# Patient Record
Sex: MALE | Race: ASIAN | NOT HISPANIC OR LATINO | URBAN - METROPOLITAN AREA
[De-identification: names, ages, dates, MRNs, and addresses within clinical notes are randomized per-mention and may not be internally consistent; named-entity substitution may affect disease eponyms.]

---

## 2018-04-19 ENCOUNTER — OFFICE VISIT - HEALTHEAST (OUTPATIENT)
Dept: FAMILY MEDICINE | Facility: CLINIC | Age: 43
End: 2018-04-19

## 2018-04-19 DIAGNOSIS — Z86.19 HISTORY OF HEPATITIS B: ICD-10-CM

## 2018-04-19 DIAGNOSIS — Z13.220 SCREENING CHOLESTEROL LEVEL: ICD-10-CM

## 2018-04-19 LAB
ALBUMIN SERPL-MCNC: 4.1 G/DL (ref 3.5–5)
ALP SERPL-CCNC: 70 U/L (ref 45–120)
ALT SERPL W P-5'-P-CCNC: 50 U/L (ref 0–45)
ANION GAP SERPL CALCULATED.3IONS-SCNC: 11 MMOL/L (ref 5–18)
AST SERPL W P-5'-P-CCNC: 31 U/L (ref 0–40)
BILIRUB SERPL-MCNC: 0.5 MG/DL (ref 0–1)
BUN SERPL-MCNC: 12 MG/DL (ref 8–22)
CALCIUM SERPL-MCNC: 9.1 MG/DL (ref 8.5–10.5)
CHLORIDE BLD-SCNC: 104 MMOL/L (ref 98–107)
CHOLEST SERPL-MCNC: 230 MG/DL
CO2 SERPL-SCNC: 26 MMOL/L (ref 22–31)
CREAT SERPL-MCNC: 1.06 MG/DL (ref 0.7–1.3)
FASTING STATUS PATIENT QL REPORTED: ABNORMAL
GFR SERPL CREATININE-BSD FRML MDRD: >60 ML/MIN/1.73M2
GLUCOSE BLD-MCNC: 93 MG/DL (ref 70–125)
HDLC SERPL-MCNC: 48 MG/DL
LDLC SERPL CALC-MCNC: 154 MG/DL
POTASSIUM BLD-SCNC: 4.6 MMOL/L (ref 3.5–5)
PROT SERPL-MCNC: 7.9 G/DL (ref 6–8)
SODIUM SERPL-SCNC: 141 MMOL/L (ref 136–145)
TRIGL SERPL-MCNC: 142 MG/DL

## 2018-04-19 ASSESSMENT — MIFFLIN-ST. JEOR: SCORE: 1585.04

## 2018-04-20 LAB
HAV IGM SERPL QL IA: NEGATIVE
HBV CORE IGM SERPL QL IA: NEGATIVE
HBV SURFACE AB SERPL IA-ACNC: NEGATIVE M[IU]/ML
HBV SURFACE AG SERPL QL IA: ABNORMAL
HCV AB SERPL QL IA: NEGATIVE

## 2018-04-24 ENCOUNTER — AMBULATORY - HEALTHEAST (OUTPATIENT)
Dept: FAMILY MEDICINE | Facility: CLINIC | Age: 43
End: 2018-04-24

## 2018-04-24 DIAGNOSIS — B19.10 HEPATITIS B: ICD-10-CM

## 2018-04-25 ENCOUNTER — AMBULATORY - HEALTHEAST (OUTPATIENT)
Dept: LAB | Facility: CLINIC | Age: 43
End: 2018-04-25

## 2018-04-25 DIAGNOSIS — B19.10 HEPATITIS B: ICD-10-CM

## 2018-04-25 LAB — AFP SERPL-MCNC: <2 UG/ML

## 2018-04-27 LAB
HBV DNA SERPL NAA+PROBE-ACNC: 1381 [IU]/ML
HBV DNA SERPL NAA+PROBE-LOG IU: 3.1 {LOG_IU}/ML

## 2018-05-02 ENCOUNTER — AMBULATORY - HEALTHEAST (OUTPATIENT)
Dept: FAMILY MEDICINE | Facility: CLINIC | Age: 43
End: 2018-05-02

## 2018-05-02 DIAGNOSIS — B19.10 HEPATITIS B: ICD-10-CM

## 2018-05-03 ENCOUNTER — HOSPITAL ENCOUNTER (OUTPATIENT)
Dept: ULTRASOUND IMAGING | Facility: HOSPITAL | Age: 43
Discharge: HOME | End: 2018-05-03
Attending: FAMILY MEDICINE

## 2018-05-03 DIAGNOSIS — B19.10 HEPATITIS B: ICD-10-CM

## 2021-06-01 VITALS — HEIGHT: 64 IN | BODY MASS INDEX: 29.81 KG/M2 | WEIGHT: 174.6 LBS

## 2021-06-17 NOTE — PROGRESS NOTES
"Assessment / Impression     1. History of hepatitis B  Hepatitis Acute Evaluation    Comprehensive Metabolic Panel    Hepatitis B Surface Antibody (Anti-HBs) Vaccine Check   2. Screening cholesterol level  Lipid Cascade         Plan:     I recommended that we check the above labs.  It will be helpful to see what the hepatitis B surface antigen result is.  If this is negative and the core antibody test is positive symptoms are likely secondary to a resolved hepatitis B infection.  I recommended that we check the liver enzymes as well.  Since he is fasting we are going to check a cholesterol panel.    Subjective:      HPI: Cheyenne Barnes is a 42 y.o. male who presents to the clinic for follow-up.  He reports having history of hepatitis B based on lab results.  He states that his stepmother had liver cirrhosis and hepatitis B.  His brother is a \"hepatitis B carrier\".  He thinks he may have picked this up from his stepmother.  They were doing traditional treatments that involved pricking her skin and he was exposed to some blood from this.  He denies having any other medical concerns.  He does not feel ill.  He denies jaundice, abdominal pain or fatigue symptoms.  In 2016 he had a positive hepatitis B core antibody result.  The hepatitis B surface antibody result was negative.  He was referred to GI, but he did not set up an appointment with them.        Review of Systems  All other systems reviewed and are negative.     History   Smoking Status     Never Smoker   Smokeless Tobacco     Never Used       Family History   Problem Relation Age of Onset     Stroke Father      Hepatitis Brother      Heart disease Paternal Uncle      Stroke Paternal Uncle        Objective:     /76 (Patient Site: Left Arm, Patient Position: Sitting, Cuff Size: Adult Regular)  Pulse (!) 56  Resp 16  Ht 5' 3.5\" (1.613 m)  Wt 174 lb 9.6 oz (79.2 kg)  BMI 30.44 kg/m2  Physical Examination: General appearance - alert, well appearing, and in " no distress  Eyes: pupils equal and reactive, extraocular eye movements intact. No scleral icterus  Mouth: mucous membranes moist, pharynx normal without lesions.    Neck: supple, no significant adenopathy  Lungs: clear to auscultation, no wheezes, rales or rhonchi, symmetric air entry  Heart: normal rate, regular rhythm, normal S1, S2, no murmurs, rubs, clicks or gallops  Abdomen: soft, nontender, nondistended, no masses or organomegaly  Neurological: alert, oriented, normal speech, no focal findings or movement disorder noted.    Extremities: No edema, no clubbing or cyanosis  Psychiatric: Normal affect. Does not appear anxious or depressed.    No results found for this or any previous visit (from the past 168 hour(s)).    No current outpatient prescriptions on file.

## 2021-06-17 NOTE — PROGRESS NOTES
I spoke with him about his lab results and recommended he see GI for further evaluation.  The referral was entered.  I answered questions he had about hepatitis B.  He has a right upper quadrant ultrasound tomorrow.

## 2021-08-15 ENCOUNTER — HEALTH MAINTENANCE LETTER (OUTPATIENT)
Age: 46
End: 2021-08-15

## 2021-10-11 ENCOUNTER — HEALTH MAINTENANCE LETTER (OUTPATIENT)
Age: 46
End: 2021-10-11

## 2022-09-25 ENCOUNTER — HEALTH MAINTENANCE LETTER (OUTPATIENT)
Age: 47
End: 2022-09-25

## 2023-10-14 ENCOUNTER — HEALTH MAINTENANCE LETTER (OUTPATIENT)
Age: 48
End: 2023-10-14